# Patient Record
Sex: FEMALE | Race: WHITE | NOT HISPANIC OR LATINO | URBAN - METROPOLITAN AREA
[De-identification: names, ages, dates, MRNs, and addresses within clinical notes are randomized per-mention and may not be internally consistent; named-entity substitution may affect disease eponyms.]

---

## 2021-01-12 ENCOUNTER — OFFICE VISIT (OUTPATIENT)
Dept: URBAN - METROPOLITAN AREA CLINIC 40 | Facility: CLINIC | Age: 86
End: 2021-01-12
Payer: MEDICARE

## 2021-01-12 PROCEDURE — 99204 OFFICE O/P NEW MOD 45 MIN: CPT | Performed by: OPTOMETRIST

## 2021-01-12 ASSESSMENT — INTRAOCULAR PRESSURE
OS: 13
OD: 13

## 2021-01-12 NOTE — IMPRESSION/PLAN
Impression: Type 2 diab w mild nonprlf diabetic rtnop w/o macular edema, bilateral: E33.8495. Plan: Diabetes type II: mild background diabetic retinopathy, no signs of neovascularization noted. No treatment necessary at this time. Patient was instructed to monitor vision for sudden changes and to call if visual changes noted. Discussed ocular and systemic benefits of blood sugar control. No signs of RD OU.

## 2021-01-28 ENCOUNTER — OFFICE VISIT (OUTPATIENT)
Dept: URBAN - METROPOLITAN AREA CLINIC 40 | Facility: CLINIC | Age: 86
End: 2021-01-28
Payer: MEDICARE

## 2021-01-28 DIAGNOSIS — H04.123 DRY EYE SYNDROME OF BILATERAL LACRIMAL GLANDS: Primary | ICD-10-CM

## 2021-01-28 DIAGNOSIS — H52.4 PRESBYOPIA: ICD-10-CM

## 2021-01-28 PROCEDURE — 99213 OFFICE O/P EST LOW 20 MIN: CPT | Performed by: OPTOMETRIST

## 2021-01-28 ASSESSMENT — VISUAL ACUITY
OS: 20/50
OD: 20/40

## 2021-01-28 ASSESSMENT — KERATOMETRY
OS: 44.25
OD: 43.25

## 2021-11-08 ENCOUNTER — OFFICE VISIT (OUTPATIENT)
Dept: URBAN - METROPOLITAN AREA CLINIC 30 | Facility: CLINIC | Age: 86
End: 2021-11-08
Payer: MEDICARE

## 2021-11-08 DIAGNOSIS — Z79.4 LONG TERM (CURRENT) USE OF INSULIN: ICD-10-CM

## 2021-11-08 DIAGNOSIS — H35.371 PUCKERING OF MACULA, RIGHT EYE: ICD-10-CM

## 2021-11-08 DIAGNOSIS — E11.3293 DIABETES MELLITUS TYPE 2 WITH MILD NON-PROLIFERATIVE RETINOPATHY WITHOUT MACULAR EDEMA, BILATERAL: Primary | ICD-10-CM

## 2021-11-08 DIAGNOSIS — H16.223 KERATOCONJUNCTIVITIS SICCA, BILATERAL: ICD-10-CM

## 2021-11-08 PROCEDURE — 92250 FUNDUS PHOTOGRAPHY W/I&R: CPT | Performed by: STUDENT IN AN ORGANIZED HEALTH CARE EDUCATION/TRAINING PROGRAM

## 2021-11-08 PROCEDURE — 92134 CPTRZ OPH DX IMG PST SGM RTA: CPT | Performed by: STUDENT IN AN ORGANIZED HEALTH CARE EDUCATION/TRAINING PROGRAM

## 2021-11-08 PROCEDURE — 92004 COMPRE OPH EXAM NEW PT 1/>: CPT | Performed by: STUDENT IN AN ORGANIZED HEALTH CARE EDUCATION/TRAINING PROGRAM

## 2021-11-08 ASSESSMENT — VISUAL ACUITY
OS: 20/60
OD: 20/40

## 2021-11-08 ASSESSMENT — KERATOMETRY
OS: 44.47
OD: 43.41

## 2021-11-08 ASSESSMENT — INTRAOCULAR PRESSURE
OS: 14
OD: 14

## 2021-11-08 NOTE — IMPRESSION/PLAN
Impression: Puckering of macula, right eye: H35.371.
- traction of inner retinal layers and few cystic spaces, ~stable vs 2015 oct mac  Plan: Pt ed condition; monitor vision monocularly, seek care w/ changes

## 2021-11-08 NOTE — IMPRESSION/PLAN
Impression: Diabetes mellitus Type 2 with mild non-proliferative retinopathy without macular edema, bilateral: T51.5325. Plan: Pt ed on findings and importance of blood sugar control Cont f/u with PCP